# Patient Record
(demographics unavailable — no encounter records)

---

## 2025-06-11 NOTE — HISTORY OF PRESENT ILLNESS
[de-identified] : 4 year old male with nonverbal autism presents for sensitivity to loud noises.  Mother reports school has noticed patient is very sensitive to loud noises and will cry a lot.  Has had 3 ear infections in the last year. 1 ear infection in the last 6 months. Last one was in March No otorrhea Does not talk, only makes noises. Receiving early intervention No concerns for hearing Passed [facility]  No snoring, mouth breathing, nasal congestion.

## 2025-06-11 NOTE — HISTORY OF PRESENT ILLNESS
[de-identified] : 4 year old male with nonverbal autism presents for sensitivity to loud noises.  Mother reports school has noticed patient is very sensitive to loud noises and will cry a lot.  Has had 3 ear infections in the last year. 1 ear infection in the last 6 months. Last one was in March No otorrhea Does not talk, only makes noises. Receiving early intervention No concerns for hearing Passed [facility]  No snoring, mouth breathing, nasal congestion.

## 2025-06-11 NOTE — DATA REVIEWED
[FreeTextEntry1] : 1. history obtained from primary caregiver as independent historian due to patient's developmental age and limited recall 2. [facility] notes reviewed x 6  3. referral documents reviewed  I ordered, reviewed and interpreted today's audiometric testing myself and discussed the results with the family.  Suspected diagnosis: Bilateral conductive hearing loss  My interpretation is in keeping with:   Right ear:  type C tympanogram Left ear:  type A tympanogram   Normal hearing bilaterally / in the soundfield